# Patient Record
Sex: FEMALE | Race: BLACK OR AFRICAN AMERICAN | Employment: UNEMPLOYED | URBAN - METROPOLITAN AREA
[De-identification: names, ages, dates, MRNs, and addresses within clinical notes are randomized per-mention and may not be internally consistent; named-entity substitution may affect disease eponyms.]

---

## 2021-08-30 ENCOUNTER — HOSPITAL ENCOUNTER (EMERGENCY)
Facility: CLINIC | Age: 31
Discharge: HOME | End: 2021-08-30
Attending: PHYSICIAN ASSISTANT | Admitting: PHYSICIAN ASSISTANT

## 2021-08-30 VITALS
TEMPERATURE: 97.1 F | OXYGEN SATURATION: 100 % | SYSTOLIC BLOOD PRESSURE: 117 MMHG | DIASTOLIC BLOOD PRESSURE: 97 MMHG | HEART RATE: 94 BPM | RESPIRATION RATE: 22 BRPM | WEIGHT: 125 LBS

## 2021-08-30 DIAGNOSIS — T50.901A DRUG OVERDOSE: ICD-10-CM

## 2021-08-30 DIAGNOSIS — F11.91 HISTORY OF HEROIN USE: ICD-10-CM

## 2021-08-30 PROCEDURE — 99283 EMERGENCY DEPT VISIT LOW MDM: CPT

## 2021-08-30 ASSESSMENT — ENCOUNTER SYMPTOMS
WEAKNESS: 0
SHORTNESS OF BREATH: 0
CONFUSION: 0
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
## 2021-08-30 NOTE — ED PROVIDER NOTES
Emergency Department Attending Supervision Note  2/21/2018  4:46 PM      I evaluated this patient in conjunction with Bessie Stone PA-C      Briefly, the patient presented with accidental overdose from recreational opioid use.  No suicidal homicidal ideation no desire to stop using drugs.      On my exam,       Gen: Resting comfortably   Eyes: Normal conjunctiva, No  discharge  CV: ppi, regular   Resp: speaking in full sentences without any resp distress  Skin: warm dry well perfused  Neuro: Alert, no gross motor or sensory deficits              My impression is accidental dose from recreational opioid use.  Remained stable here without needing a repeat Narcan dosing.  Patient has capacity to make her own medical decisions was discharged home.  She was given outpatient Narcan to use in the future if needed.        Diagnosis    ICD-10-CM    1. Drug overdose  T50.901A    2. History of heroin use  Z87.898            Alok Palencia MD  [facility]  [facility]       Alok Palencia MD  08/31/21 [id number]

## 2021-08-30 NOTE — ED NOTES
Bed: ED36  Expected date:   Expected time:   Means of arrival:   Comments:  BV2 O D Narcan given alert now

## 2021-08-30 NOTE — ED NOTES
Patient alert and oriented. Respirations even and unlabored. All discharge education given. All questions answered. All medications explained in detail, given narcan IN to go home with. Patient denies further needs and states that they are ready to leave. Patient ambulated out of the ER with steady gait.

## 2021-08-30 NOTE — ED PROVIDER NOTES
History   Chief Complaint:  Drug Overdose     The history is provided by the patient.      Mary Carmen Mojica is a 30 year old female with history of heroin use, pcp use, and anxiety who presents with heroin overdose. Mary Carmen's boyfriend found her unresponsive in the bathroom. He gave her three rounds of narcan and she became responsive by the time EMS arrived. Upon EMS arrival, Mary Carmen was alert and oriented. She admits to using heroin for the past 7 years and occasionally uses it with PCP. She states that she used heroin at 1:00 PM today and is unsure as to what caused her overdose.     Upon evaluation in the ED, she states that she has never overdosed before. She explains that she was really confused at first, but feels more alert now. She injects the heroin into her right arm; she does not inject the PCP. She denies fever, chest pain, shortness of breath, weakness, and confusion.     Review of Systems   Respiratory: Negative for shortness of breath.    Cardiovascular: Negative for chest pain.   Neurological: Negative for weakness.   Psychiatric/Behavioral: Negative for confusion.   All other systems reviewed and are negative.    Allergies:  No known allergies    Medications:  The patient did not report the use of any daily medications    Past Medical History:    Anxiety   Drug abuse    Social History:  History of heroin use  History of PCP use  Arrives via EMS  Presents alone    Physical Exam     Patient Vitals for the past 24 hrs:   BP Temp Temp src Pulse Resp SpO2 Weight   08/30/21 1730 (!) 117/97 -- -- 94 22 -- --   08/30/21 1715 (!) 117/97 -- -- 92 -- 100 % --   08/30/21 1700 123/79 -- -- 98 13 98 % --   08/30/21 1645 125/85 -- -- 91 -- 99 % --   08/30/21 1630 124/84 -- -- 90 26 99 % --   08/30/21 1615 123/86 -- -- 94 13 100 % --   08/30/21 1600 125/84 -- -- 91 12 100 % --   08/30/21 1546 (!) 135/91 97.1  F (36.2  C) Oral 82 18 100 % 56.7 kg (125 lb)       Physical Exam  Vitals signs and nursing note reviewed.    HENT:      Nose: Nose normal. No congestion or rhinorrhea.      Mouth/Throat:      Mouth: Mucous membranes are moist.      Pharynx: Oropharynx is clear. No oropharyngeal exudate or posterior oropharyngeal erythema.   Eyes:      General: No scleral icterus.     Extraocular Movements: Extraocular movements intact.      Conjunctiva/sclera: Conjunctivae normal.      Pupils: Pupils are equal, round, and reactive to light.   Cardiovascular:      Rate and Rhythm: Regular rhythm. Normal Rate.     Pulses: Normal pulses.      Heart sounds: Normal heart sounds.   Pulmonary:      Effort: Pulmonary effort is normal.      Breath sounds: Normal breath sounds.   Abdominal:      General: Abdomen is flat. Bowel sounds are normal.      Palpations: Abdomen is soft.      Tenderness: There is no abdominal tenderness.   Musculoskeletal: Normal range of motion.      Right lower leg: No edema.      Left lower leg: No edema.   Skin:     General: Skin is warm and dry.   Neurological:      Mental Status: Alert. Speech normal. Responds appropriately to questions.   Psychiatric:         Mood and Affect: Mood normal.         Behavior: Behavior normal.     Emergency Department Course     Emergency Department Course:    Reviewed:  I reviewed nursing notes, vitals and past medical history    Assessments:  1548 I obtained history and examined the patient as noted above.   I rechecked the patient. She was alert and denied any additional medical concerns.   1711 I rechecked the patient. She is alert and oriented. Denied any medical concerns.     Disposition:  The patient was discharged to home.     Impression & Plan     Medical Decision Making:  Mary Carmen Mojica is a 30 year old female who presents for evaluation following an unintentional drug overdose.  Vitals were reviewed.  Patient afebrile and hemodynamically stable.  Upon arrival to the emergency department, the patient was alert and interacting appropriately with provider.  She denied  suicidal/homicidal ideations or plans. No indication for hold at this time.  The patient admits to using IV heroin and PCP.  In the emergency department, the patient was monitored on cardiac telemetry.  No abnormalities noted.  She was observed for greater than 90 minutes and was rechecked on numerous occasions. She remained alert without any systemic concerns.  Labs and further evaluation were deferred.  The patient was ultimately discharged home in stable condition with Narcan.  She was advised to follow-up with her primary care provider for recheck.  Strict return precautions were discussed.  All questions and concerns were addressed prior to discharge.    Covid-19  Mary Carmen Mojica was evaluated during a global COVID-19 pandemic, which necessitated consideration that the patient might be at risk for infection with the SARS-CoV-2 virus that causes COVID-19.   Applicable protocols for evaluation were followed during the patient's care. COVID-19 was considered as part of the patient's evaluation.     Diagnosis:    ICD-10-CM    1. Drug overdose  T50.901A    2. History of heroin use  Z87.898      Scribe Disclosure:  IIzzy, am serving as a scribe at 3:48 PM on 8/30/2021 to document services personally performed by Bessie Stone PA-C based on my observations and the provider's statements to me.          Bessie Stone PA-C  08/30/21 [id number]

## 2021-08-30 NOTE — DISCHARGE INSTRUCTIONS
Please follow-up with your primary care provider in 1 to 2 days for recheck.  Return to the emergency department as needed.

## 2021-09-01 ENCOUNTER — HOSPITAL ENCOUNTER (EMERGENCY)
Facility: CLINIC | Age: 31
Discharge: HOME | End: 2021-09-01
Attending: EMERGENCY MEDICINE | Admitting: EMERGENCY MEDICINE

## 2021-09-01 VITALS
HEART RATE: 110 BPM | RESPIRATION RATE: 15 BRPM | DIASTOLIC BLOOD PRESSURE: 98 MMHG | TEMPERATURE: 99 F | OXYGEN SATURATION: 93 % | SYSTOLIC BLOOD PRESSURE: 136 MMHG

## 2021-09-01 DIAGNOSIS — Z53.21 ELOPED FROM EMERGENCY DEPARTMENT: ICD-10-CM

## 2021-09-01 DIAGNOSIS — F11.10 HEROIN ABUSE (H): ICD-10-CM

## 2021-09-01 DIAGNOSIS — F41.0 PANIC ATTACK: ICD-10-CM

## 2021-09-01 PROCEDURE — 99283 EMERGENCY DEPT VISIT LOW MDM: CPT

## 2021-09-01 PROCEDURE — 250N000013 HC RX MED GY IP 250 OP 250 PS 637: Performed by: EMERGENCY MEDICINE

## 2021-09-01 RX ORDER — LORAZEPAM 1 MG/1
1 TABLET ORAL ONCE
Status: COMPLETED | OUTPATIENT
Start: 2021-09-01 | End: 2021-09-01

## 2021-09-01 RX ADMIN — LORAZEPAM 1 MG: 1 TABLET ORAL at 16:54

## 2021-09-01 ASSESSMENT — ENCOUNTER SYMPTOMS
NERVOUS/ANXIOUS: 1
VOMITING: 0
SHORTNESS OF BREATH: 1
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
## 2021-09-01 NOTE — ED NOTES
Pt is given socks and feminine products per request. Will continue to monitor. Call light within reach.

## 2021-09-01 NOTE — ED NOTES
Bed: ED21  Expected date:   Expected time:   Means of arrival: Ambulance  Comments:  Johnathon Arguelles
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
## 2021-09-03 ASSESSMENT — ENCOUNTER SYMPTOMS: WOUND: 0
